# Patient Record
(demographics unavailable — no encounter records)

---

## 2025-05-01 NOTE — HISTORY OF PRESENT ILLNESS
[TextBox_4] : 83 y/o M presents for above   COPD - no inhaler use  - DERAS but stable   ALBERTO  -using CPAP nightly w/ nasal mask

## 2025-05-01 NOTE — ASSESSMENT
[FreeTextEntry1] : COPD  - stable  -mild mediastinal adenopathy  ALBERTO/ Obesity  -   PLAN: The patient is using and benefitting from the PAP device. There is good compliance with the CPAP. New supplies will be ordered today. -repeat CT chest 6 months after last Weight loss maintenance discussed. I stressed the need maintain compliance with the PAP device. The patient is not to use an Ozone or UV sterilizer. The patient was educated in the absolute requirement to use the PAP device nightly The patient was educated about the long-term benefits of appropriate ALBERTO management   F/U in 6 months.

## 2025-07-30 NOTE — ASSESSMENT
[FreeTextEntry1] : Obesity -Discussed relationship between ALBERTO and weight  -Weight loss encouraged  ALBERTO: I reviewed the available compliance data on [employer], the previous settings of 12/11 cm h2o appropriately managed the ALBERTO, controlled the EDS and was tolerable by the patient.  I thoroughly explained the difference between a BiPAP and CPAP, the patient's original BiPAP was inadvertently ordered and converted to a CPAP manually. Based on the compliance data and previous sleep study, a CPAP is appropriate for this patient. We changed the CPAP 9 cm h2o to APAP 8-16 cm h2o and put the EPR at a level of 3, and turned the RAMP off (per pt request). We also turned the SMARToff feature off (due to pts report of the machine randomly powering off)  The current machine is not available on [employer] but the serial number is [id number] We also fit-tested the patient with his current nasal mask and provided an air fit n30  I will order new supplies for airfit n30 and regular tubing. The heated tubing is not functional    I stressed the need maintain compliance with the PAP device. The patient is not to use an Ozone or UV sterilizer. The patient was educated in the absolute requirement to use the PAP device nightly The patient was educated about the long-term benefits of appropriate ALBERTO management   F/U for 90 day compliance

## 2025-07-30 NOTE — REASON FOR VISIT
[Follow-Up] : a follow-up visit [Sleep Apnea] : sleep apnea [Obesity] : obesity [Family Member] : family member

## 2025-07-30 NOTE — HISTORY OF PRESENT ILLNESS
[TextBox_4] : 81 y/o M presented to office for ALBERTO f/u with daughter   Recently received new CPAP machine that was ordered at a pressure of 9 given most recent sleep study  Pt reports inability to tolerate machine, requesting order for BiPAP. His previous machine was set at 12/11 cm h2o and he tolerated that well. 
Unknown if ever smoked